# Patient Record
Sex: MALE | Race: WHITE | URBAN - METROPOLITAN AREA
[De-identification: names, ages, dates, MRNs, and addresses within clinical notes are randomized per-mention and may not be internally consistent; named-entity substitution may affect disease eponyms.]

---

## 2021-05-31 ENCOUNTER — RECORDS - HEALTHEAST (OUTPATIENT)
Dept: ADMINISTRATIVE | Facility: CLINIC | Age: 81
End: 2021-05-31

## 2025-04-21 ENCOUNTER — TRANSITIONAL CARE UNIT VISIT (OUTPATIENT)
Dept: GERIATRICS | Facility: CLINIC | Age: 85
End: 2025-04-21
Payer: COMMERCIAL

## 2025-04-21 ENCOUNTER — DOCUMENTATION ONLY (OUTPATIENT)
Dept: GERIATRICS | Facility: CLINIC | Age: 85
End: 2025-04-21

## 2025-04-21 VITALS
TEMPERATURE: 97 F | DIASTOLIC BLOOD PRESSURE: 64 MMHG | BODY MASS INDEX: 21.52 KG/M2 | OXYGEN SATURATION: 100 % | WEIGHT: 142 LBS | RESPIRATION RATE: 18 BRPM | SYSTOLIC BLOOD PRESSURE: 107 MMHG | HEART RATE: 71 BPM | HEIGHT: 68 IN

## 2025-04-21 DIAGNOSIS — N18.32 STAGE 3B CHRONIC KIDNEY DISEASE (H): ICD-10-CM

## 2025-04-21 DIAGNOSIS — C34.90 SQUAMOUS CELL CARCINOMA OF LUNG, UNSPECIFIED LATERALITY (H): ICD-10-CM

## 2025-04-21 DIAGNOSIS — S32.402D CLOSED NONDISPLACED FRACTURE OF LEFT ACETABULUM WITH ROUTINE HEALING, UNSPECIFIED PORTION OF ACETABULUM, SUBSEQUENT ENCOUNTER: Primary | ICD-10-CM

## 2025-04-21 DIAGNOSIS — I71.40 ABDOMINAL AORTIC ANEURYSM (AAA) WITHOUT RUPTURE, UNSPECIFIED PART: ICD-10-CM
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
## 2025-04-21 DIAGNOSIS — J44.9 CHRONIC OBSTRUCTIVE PULMONARY DISEASE, UNSPECIFIED COPD TYPE (H): ICD-10-CM

## 2025-04-21 DIAGNOSIS — I10 PRIMARY HYPERTENSION: ICD-10-CM

## 2025-04-21 PROBLEM — J43.8 OTHER EMPHYSEMA (H): Chronic | Status: ACTIVE | Noted: 2024-05-23

## 2025-04-21 PROBLEM — C34.11 MALIGNANT NEOPLASM OF UPPER LOBE OF RIGHT LUNG (H): Status: ACTIVE | Noted: 2023-09-18

## 2025-04-21 PROBLEM — N40.1 BENIGN PROSTATIC HYPERPLASIA WITH NOCTURIA: Status: ACTIVE | Noted: 2024-05-28

## 2025-04-21 PROBLEM — R35.1 BENIGN PROSTATIC HYPERPLASIA WITH NOCTURIA: Status: ACTIVE | Noted: 2024-05-28

## 2025-04-21 PROBLEM — S32.402A CLOSED FRACTURE OF LEFT ACETABULUM (H): Status: ACTIVE | Noted: 2025-04-15

## 2025-04-21 PROBLEM — C34.91 NON-SMALL CELL CANCER OF RIGHT LUNG (H): Chronic | Status: ACTIVE | Noted: 2023-10-10

## 2025-04-21 PROBLEM — I50.32 CHRONIC HEART FAILURE WITH PRESERVED EJECTION FRACTION (H): Chronic | Status: ACTIVE | Noted: 2023-10-10

## 2025-04-21 PROBLEM — N18.30 CKD (CHRONIC KIDNEY DISEASE), STAGE III (H): Status: ACTIVE | Noted: 2019-06-13

## 2025-04-21 PROCEDURE — 99310 SBSQ NF CARE HIGH MDM 45: CPT

## 2025-04-21 RX ORDER — SERTRALINE HYDROCHLORIDE 25 MG/1
25 TABLET, FILM COATED ORAL DAILY
COMMUNITY

## 2025-04-21 RX ORDER — RISPERIDONE 0.5 MG/1
0.25 TABLET ORAL 2 TIMES DAILY PRN
COMMUNITY

## 2025-04-21 RX ORDER — ASPIRIN 81 MG/1
81 TABLET, CHEWABLE ORAL DAILY
COMMUNITY

## 2025-04-21 RX ORDER — ALBUTEROL SULFATE 90 UG/1
2 INHALANT RESPIRATORY (INHALATION) EVERY 4 HOURS PRN
COMMUNITY

## 2025-04-21 RX ORDER — LOSARTAN POTASSIUM 100 MG/1
100 TABLET ORAL DAILY
COMMUNITY

## 2025-04-21 RX ORDER — ALBUTEROL SULFATE 0.83 MG/ML
2.5 SOLUTION RESPIRATORY (INHALATION) 4 TIMES DAILY PRN
COMMUNITY

## 2025-04-21 RX ORDER — ACETAMINOPHEN 500 MG
1000 TABLET ORAL EVERY 6 HOURS
COMMUNITY

## 2025-04-21 RX ORDER — TAMSULOSIN HYDROCHLORIDE 0.4 MG/1
0.4 CAPSULE ORAL DAILY
COMMUNITY

## 2025-04-21 RX ORDER — ATORVASTATIN CALCIUM 40 MG/1
20 TABLET, FILM COATED ORAL DAILY
COMMUNITY

## 2025-04-21 RX ORDER — OXYCODONE HYDROCHLORIDE 5 MG/1
5 TABLET ORAL EVERY 4 HOURS PRN
COMMUNITY
End: 2025-04-21

## 2025-04-21 RX ORDER — FOLIC ACID 1 MG/1
1 TABLET ORAL DAILY
COMMUNITY

## 2025-04-21 RX ORDER — AMLODIPINE BESYLATE 5 MG/1
5 TABLET ORAL DAILY
COMMUNITY

## 2025-04-21 NOTE — PROGRESS NOTES
"[facility] GERIATRICS    PRIMARY CARE PROVIDER AND CLINIC:  No primary care provider on file., No primary physician on file.  Chief Complaint   Patient presents with    Hospital F/U      [place] Medical Record Number:  [id number]  Place of Service where encounter took place:  [facility] ([facility]) [[id number]]    Arvind Edmonds  is a 85 year old  (1940), admitted to the above facility from  [facility]. Hospital stay 4/15/2025 through 4/18/2025..   HPI:    Brief Hospital Course:  Arvind presented to the ED following a fall at home. He's left leg \"gave out.\" In the ED, CT demonstrated acute nondisplaced acetabular fracture. Seen by trauma surgery and recommended non operative treatment. Transferred to TCU for rehabilitation.     Today Arvind is sitting in his wheelchair. He states he is doing well, pain is controlled with tylenol. He does wish to have this scheduled so he doesn't have to keep track of when he can have it. He has not used any oxycodone since admission to TCU. Sleeping well. Appetite stable. Mood stable. Having daily bowel movements. Denies urinary symptoms including dysuria or hematuria. Denies shortness of breath, chest pain, palpitations, dizziness, lightheadedness. We did discuss his code status and confirmed he is FULL CODE.      CODE STATUS/ADVANCE DIRECTIVES DISCUSSION:  No Order  CPR/Full code   ALLERGIES: No Known Allergies   PAST MEDICAL HISTORY: No past medical history on file.   PAST SURGICAL HISTORY:   has a past surgical history that includes IR Lung Biopsy Mediastinum Right (8/14/2023).  FAMILY HISTORY: family history is not on file.  SOCIAL HISTORY:     Patient's living condition: lives with family, wife and daughter     Current Outpatient Medications   Medication Sig Dispense Refill    acetaminophen (TYLENOL) 500 MG tablet Take 1,000 mg by mouth every 6 hours.      albuterol (PROAIR HFA/PROVENTIL HFA/VENTOLIN HFA) 108 (90 Base) MCG/ACT inhaler Inhale 2 puffs into the " "lungs every 4 hours as needed for shortness of breath, wheezing or cough.      albuterol (PROVENTIL) (2.5 MG/3ML) 0.083% neb solution Take 2.5 mg by nebulization 4 times daily as needed for shortness of breath, wheezing or cough.      amLODIPine (NORVASC) 5 MG tablet Take 5 mg by mouth daily.      aspirin (ASA) 81 MG chewable tablet Take 81 mg by mouth daily.      atorvastatin (LIPITOR) 40 MG tablet Take 20 mg by mouth daily.      Fluticasone-Umeclidin-Vilant (TRELEGY ELLIPTA) 100-62.5-25 MCG/ACT oral inhaler Inhale 1 puff into the lungs daily.      folic acid (FOLVITE) 1 MG tablet Take 1 mg by mouth daily.      losartan (COZAAR) 100 MG tablet Take 100 mg by mouth daily.      risperiDONE (RISPERDAL) 0.5 MG tablet Take 0.25 mg by mouth 2 times daily as needed.      sertraline (ZOLOFT) 25 MG tablet Take 25 mg by mouth daily.      tamsulosin (FLOMAX) 0.4 MG capsule Take 0.4 mg by mouth daily.       No current facility-administered medications for this visit.      ROS:  4 point ROS including Respiratory, CV, GI and , other than that noted in the HPI,  is negative    Vital signs:/64   Pulse 71   Temp 97  F (36.1  C)   Resp 18   Ht 1.727 m (5' 8\")   Wt 64.4 kg (142 lb)   SpO2 100%   BMI 21.59 kg/m     GENERAL APPEARANCE: Well developed, well nourished, in no acute distress.  LUNGS: Lung sounds CTA, no adventitious sounds, respiratory effort normal.  CARD: RRR, S1, S2, without murmurs, gallops, rubs, no JVD, peripheral pulses 2+ and symmetric  ABD: Soft, nondistended and nontender with normal bowel sounds.   MSK: Muscle strength and tone were equal bilaterally. Moves all extremities easily and intentionally.   EXTREMITIES: No cyanosis, clubbing or edema.  NEURO: Alert and oriented x 3. Normal affect. Sensation to touch was normal. Face is symmetric.  PSYCH: memory and judgment impaired at baseline     Lab/Diagnostic data:  Recent labs in [facility] reviewed by me today.     ASSESSMENT/PLAN:  Acute nondisplaced " acetabular fracture  Fall  Non operative treatment plan  Pain management: will schedule tylenol per pt request. Discontinue oxycodone due to not using.   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     Electronically signed by:  RANDY Stuart CNP on 4/21/2025 at 2:10 PM

## 2025-04-21 NOTE — LETTER
" 4/21/2025      Arvind Edmonds  [address] [place] [address]        [facility] GERIATRICS    PRIMARY CARE PROVIDER AND CLINIC:  No primary care provider on file., No primary physician on file.  Chief Complaint   Patient presents with     Hospital F/U      [place] Medical Record Number:  [id number]  Place of Service where encounter took place:  [facility] ([facility]) [[id number]]    Arvind Edmonds  is a 85 year old  (1940), admitted to the above facility from  [facility]. Hospital stay 4/15/2025 through 4/18/2025..   HPI:    Brief Hospital Course:  Arvind presented to the ED following a fall at home. He's left leg \"gave out.\" In the ED, CT demonstrated acute nondisplaced acetabular fracture. Seen by trauma surgery and recommended non operative treatment. Transferred to TCU for rehabilitation.     Today Arvind is sitting in his wheelchair. He states he is doing well, pain is controlled with tylenol. He does wish to have this scheduled so he doesn't have to keep track of when he can have it. He has not used any oxycodone since admission to TCU. Sleeping well. Appetite stable. Mood stable. Having daily bowel movements. Denies urinary symptoms including dysuria or hematuria. Denies shortness of breath, chest pain, palpitations, dizziness, lightheadedness. We did discuss his code status and confirmed he is FULL CODE.      CODE STATUS/ADVANCE DIRECTIVES DISCUSSION:  No Order  CPR/Full code   ALLERGIES: No Known Allergies   PAST MEDICAL HISTORY: No past medical history on file.   PAST SURGICAL HISTORY:   has a past surgical history that includes IR Lung Biopsy Mediastinum Right (8/14/2023).  FAMILY HISTORY: family history is not on file.  SOCIAL HISTORY:     Patient's living condition: lives with family, wife and daughter     Current Outpatient Medications   Medication Sig Dispense Refill     acetaminophen (TYLENOL) 500 MG tablet Take 1,000 mg by mouth every 6 hours.       albuterol (PROAIR " "HFA/PROVENTIL HFA/VENTOLIN HFA) 108 (90 Base) MCG/ACT inhaler Inhale 2 puffs into the lungs every 4 hours as needed for shortness of breath, wheezing or cough.       albuterol (PROVENTIL) (2.5 MG/3ML) 0.083% neb solution Take 2.5 mg by nebulization 4 times daily as needed for shortness of breath, wheezing or cough.       amLODIPine (NORVASC) 5 MG tablet Take 5 mg by mouth daily.       aspirin (ASA) 81 MG chewable tablet Take 81 mg by mouth daily.       atorvastatin (LIPITOR) 40 MG tablet Take 20 mg by mouth daily.       Fluticasone-Umeclidin-Vilant (TRELEGY ELLIPTA) 100-62.5-25 MCG/ACT oral inhaler Inhale 1 puff into the lungs daily.       folic acid (FOLVITE) 1 MG tablet Take 1 mg by mouth daily.       losartan (COZAAR) 100 MG tablet Take 100 mg by mouth daily.       risperiDONE (RISPERDAL) 0.5 MG tablet Take 0.25 mg by mouth 2 times daily as needed.       sertraline (ZOLOFT) 25 MG tablet Take 25 mg by mouth daily.       tamsulosin (FLOMAX) 0.4 MG capsule Take 0.4 mg by mouth daily.       No current facility-administered medications for this visit.      ROS:  4 point ROS including Respiratory, CV, GI and , other than that noted in the HPI,  is negative    Vital signs:/64   Pulse 71   Temp 97  F (36.1  C)   Resp 18   Ht 1.727 m (5' 8\")   Wt 64.4 kg (142 lb)   SpO2 100%   BMI 21.59 kg/m     GENERAL APPEARANCE: Well developed, well nourished, in no acute distress.  LUNGS: Lung sounds CTA, no adventitious sounds, respiratory effort normal.  CARD: RRR, S1, S2, without murmurs, gallops, rubs, no JVD, peripheral pulses 2+ and symmetric  ABD: Soft, nondistended and nontender with normal bowel sounds.   MSK: Muscle strength and tone were equal bilaterally. Moves all extremities easily and intentionally.   EXTREMITIES: No cyanosis, clubbing or edema.  NEURO: Alert and oriented x 3. Normal affect. Sensation to touch was normal. Face is symmetric.  PSYCH: memory and judgment impaired at baseline   "   Lab/Diagnostic data:  Recent labs in [facility] reviewed by me today.     ASSESSMENT/PLAN:  Acute nondisplaced acetabular fracture  Fall  Non operative treatment plan  Pain management: will schedule tylenol per pt request. Discontinue oxycodone due to not using.   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     Electronically signed by:  RANDY Stuart CNP on 4/21/2025 at 2:10 PM                   Sincerely,        RANDY Stuart CNP    Electronically signed

## 2025-04-23 ENCOUNTER — TRANSITIONAL CARE UNIT VISIT (OUTPATIENT)
Dept: GERIATRICS | Facility: CLINIC | Age: 85
End: 2025-04-23
Payer: COMMERCIAL

## 2025-04-23 ENCOUNTER — LAB REQUISITION (OUTPATIENT)
Dept: LAB | Facility: CLINIC | Age: 85
End: 2025-04-23
Payer: COMMERCIAL

## 2025-04-23 VITALS
DIASTOLIC BLOOD PRESSURE: 63 MMHG | SYSTOLIC BLOOD PRESSURE: 106 MMHG | HEIGHT: 68 IN | WEIGHT: 141.8 LBS | BODY MASS INDEX: 21.49 KG/M2 | HEART RATE: 79 BPM | TEMPERATURE: 98.3 F | OXYGEN SATURATION: 95 % | RESPIRATION RATE: 16 BRPM

## 2025-04-23 DIAGNOSIS — N18.32 STAGE 3B CHRONIC KIDNEY DISEASE (H): ICD-10-CM

## 2025-04-23 DIAGNOSIS — I71.40 ABDOMINAL AORTIC ANEURYSM (AAA) WITHOUT RUPTURE, UNSPECIFIED PART: ICD-10-CM

## 2025-04-23 DIAGNOSIS — I10 PRIMARY HYPERTENSION: ICD-10-CM

## 2025-04-23 DIAGNOSIS — J44.9 CHRONIC OBSTRUCTIVE PULMONARY DISEASE, UNSPECIFIED COPD TYPE (H): ICD-10-CM

## 2025-04-23 DIAGNOSIS — N18.30 CHRONIC KIDNEY DISEASE, STAGE 3 UNSPECIFIED (H): ICD-10-CM

## 2025-04-23 DIAGNOSIS — C34.90 SQUAMOUS CELL CARCINOMA OF LUNG, UNSPECIFIED LATERALITY (H): ICD-10-CM

## 2025-04-23 DIAGNOSIS — S32.402D CLOSED NONDISPLACED FRACTURE OF LEFT ACETABULUM WITH ROUTINE HEALING, UNSPECIFIED PORTION OF ACETABULUM, SUBSEQUENT ENCOUNTER: Primary | ICD-10-CM
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
## 2025-04-23 PROCEDURE — 99309 SBSQ NF CARE MODERATE MDM 30: CPT

## 2025-04-23 NOTE — PROGRESS NOTES
"[facility] GERIATRICS    PRIMARY CARE PROVIDER AND CLINIC:  No primary care provider on file., No primary physician on file.  Chief Complaint   Patient presents with    RECHECK      [place] Medical Record Number:  [id number]  Place of Service where encounter took place:  [facility] ([facility]) [[id number]]    Arvind Edmonds  is a 85 year old  (1940), admitted to the above facility from  [facility]. Hospital stay 4/15/2025 through 4/18/2025..   HPI:    Brief Hospital Course:  Arvind presented to the ED following a fall at home. He's left leg \"gave out.\" In the ED, CT demonstrated acute nondisplaced acetabular fracture. Seen by trauma surgery and recommended non operative treatment. Transferred to TCU for rehabilitation.     Today Arvind does not have any concerns. Pain is controlled (mainly in left hip area). Sleeping well. Appetite stable. Mood stable. Having daily bowel movements. Denies urinary symptoms including dysuria or hematuria. Denies shortness of breath, chest pain, palpitations, dizziness, lightheadedness. States he was a heavy smoker years ago and has baseline shortness of breath with activity but has not experienced any worsening shortness of breath. States he is working hard in therapy to get back home.       CODE STATUS/ADVANCE DIRECTIVES DISCUSSION:  Full Code  CPR/Full code     Current Outpatient Medications   Medication Sig Dispense Refill    acetaminophen (TYLENOL) 500 MG tablet Take 1,000 mg by mouth every 6 hours.      albuterol (PROAIR HFA/PROVENTIL HFA/VENTOLIN HFA) 108 (90 Base) MCG/ACT inhaler Inhale 2 puffs into the lungs every 4 hours as needed for shortness of breath, wheezing or cough.      albuterol (PROVENTIL) (2.5 MG/3ML) 0.083% neb solution Take 2.5 mg by nebulization 4 times daily as needed for shortness of breath, wheezing or cough.      amLODIPine (NORVASC) 5 MG tablet Take 5 mg by mouth daily.      aspirin (ASA) 81 MG chewable tablet Take 81 mg by mouth daily.  " "    atorvastatin (LIPITOR) 40 MG tablet Take 20 mg by mouth daily.      Fluticasone-Umeclidin-Vilant (TRELEGY ELLIPTA) 100-62.5-25 MCG/ACT oral inhaler Inhale 1 puff into the lungs daily.      folic acid (FOLVITE) 1 MG tablet Take 1 mg by mouth daily.      losartan (COZAAR) 100 MG tablet Take 100 mg by mouth daily.      risperiDONE (RISPERDAL) 0.5 MG tablet Take 0.25 mg by mouth 2 times daily as needed.      sertraline (ZOLOFT) 25 MG tablet Take 25 mg by mouth daily.      tamsulosin (FLOMAX) 0.4 MG capsule Take 0.4 mg by mouth daily.       No current facility-administered medications for this visit.      ROS:  Other than stated in HPI all other review of systems is negative.      Vital signs:/63   Pulse 79   Temp 98.3  F (36.8  C)   Resp 16   Ht 1.727 m (5' 8\")   Wt 64.3 kg (141 lb 12.8 oz)   SpO2 95%   BMI 21.56 kg/m     GENERAL APPEARANCE: Well developed, well nourished, in no acute distress.  LUNGS: Lung sounds CTA, no adventitious sounds, respiratory effort normal.  CARD: RRR, S1, S2, without murmurs, gallops, rubs, no JVD, peripheral pulses 2+ and symmetric  ABD: Soft, nondistended and nontender with normal bowel sounds.   MSK: Muscle strength and tone were equal bilaterally. Moves all extremities easily and intentionally.   EXTREMITIES: No cyanosis, clubbing or edema.  NEURO: Alert and oriented x 3. Normal affect. Sensation to touch was normal. Face is symmetric.  PSYCH: memory and judgement impaired at baseline     Lab/Diagnostic data:  Labs reviewed in [facility] by me.      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

## 2025-04-23 NOTE — LETTER
" 4/23/2025      Arvind Edmonds  [address] [place] [address]        [facility] GERIATRICS    PRIMARY CARE PROVIDER AND CLINIC:  No primary care provider on file., No primary physician on file.  Chief Complaint   Patient presents with     RECHECK      [place] Medical Record Number:  [id number]  Place of Service where encounter took place:  [facility] ([facility]) [[id number]]    Arvind Edmonds  is a 85 year old  (1940), admitted to the above facility from  [facility]. Hospital stay 4/15/2025 through 4/18/2025..   HPI:    Brief Hospital Course:  Arvind presented to the ED following a fall at home. He's left leg \"gave out.\" In the ED, CT demonstrated acute nondisplaced acetabular fracture. Seen by trauma surgery and recommended non operative treatment. Transferred to TCU for rehabilitation.     Today Arvind does not have any concerns. Pain is controlled (mainly in left hip area). Sleeping well. Appetite stable. Mood stable. Having daily bowel movements. Denies urinary symptoms including dysuria or hematuria. Denies shortness of breath, chest pain, palpitations, dizziness, lightheadedness. States he was a heavy smoker years ago and has baseline shortness of breath with activity but has not experienced any worsening shortness of breath. States he is working hard in therapy to get back home.       CODE STATUS/ADVANCE DIRECTIVES DISCUSSION:  Full Code  CPR/Full code     Current Outpatient Medications   Medication Sig Dispense Refill     acetaminophen (TYLENOL) 500 MG tablet Take 1,000 mg by mouth every 6 hours.       albuterol (PROAIR HFA/PROVENTIL HFA/VENTOLIN HFA) 108 (90 Base) MCG/ACT inhaler Inhale 2 puffs into the lungs every 4 hours as needed for shortness of breath, wheezing or cough.       albuterol (PROVENTIL) (2.5 MG/3ML) 0.083% neb solution Take 2.5 mg by nebulization 4 times daily as needed for shortness of breath, wheezing or cough.       amLODIPine (NORVASC) 5 MG tablet Take 5 mg by mouth " "daily.       aspirin (ASA) 81 MG chewable tablet Take 81 mg by mouth daily.       atorvastatin (LIPITOR) 40 MG tablet Take 20 mg by mouth daily.       Fluticasone-Umeclidin-Vilant (TRELEGY ELLIPTA) 100-62.5-25 MCG/ACT oral inhaler Inhale 1 puff into the lungs daily.       folic acid (FOLVITE) 1 MG tablet Take 1 mg by mouth daily.       losartan (COZAAR) 100 MG tablet Take 100 mg by mouth daily.       risperiDONE (RISPERDAL) 0.5 MG tablet Take 0.25 mg by mouth 2 times daily as needed.       sertraline (ZOLOFT) 25 MG tablet Take 25 mg by mouth daily.       tamsulosin (FLOMAX) 0.4 MG capsule Take 0.4 mg by mouth daily.       No current facility-administered medications for this visit.      ROS:  Other than stated in HPI all other review of systems is negative.      Vital signs:/63   Pulse 79   Temp 98.3  F (36.8  C)   Resp 16   Ht 1.727 m (5' 8\")   Wt 64.3 kg (141 lb 12.8 oz)   SpO2 95%   BMI 21.56 kg/m     GENERAL APPEARANCE: Well developed, well nourished, in no acute distress.  LUNGS: Lung sounds CTA, no adventitious sounds, respiratory effort normal.  CARD: RRR, S1, S2, without murmurs, gallops, rubs, no JVD, peripheral pulses 2+ and symmetric  ABD: Soft, nondistended and nontender with normal bowel sounds.   MSK: Muscle strength and tone were equal bilaterally. Moves all extremities easily and intentionally.   EXTREMITIES: No cyanosis, clubbing or edema.  NEURO: Alert and oriented x 3. Normal affect. Sensation to touch was normal. Face is symmetric.  PSYCH: memory and judgement impaired at baseline     Lab/Diagnostic data:  Labs reviewed in [facility] by me.      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

## 2025-04-25 LAB
ANION GAP SERPL CALCULATED.3IONS-SCNC: 14 MMOL/L (ref 7–15)
BUN SERPL-MCNC: 28.2 MG/DL (ref 8–23)
CALCIUM SERPL-MCNC: 7.9 MG/DL (ref 8.8–10.4)
CHLORIDE SERPL-SCNC: 103 MMOL/L (ref 98–107)
CREAT SERPL-MCNC: 1.98 MG/DL (ref 0.67–1.17)
EGFRCR SERPLBLD CKD-EPI 2021: 32 ML/MIN/1.73M2
ERYTHROCYTE [DISTWIDTH] IN BLOOD BY AUTOMATED COUNT: 15.3 % (ref 10–15)
GLUCOSE SERPL-MCNC: 86 MG/DL (ref 70–99)
HCO3 SERPL-SCNC: 21 MMOL/L (ref 22–29)
HCT VFR BLD AUTO: 36.2 % (ref 40–53)
HGB BLD-MCNC: 11.1 G/DL (ref 13.3–17.7)
MCH RBC QN AUTO: 28.4 PG (ref 26.5–33)
MCHC RBC AUTO-ENTMCNC: 30.7 G/DL (ref 31.5–36.5)
MCV RBC AUTO: 93 FL (ref 78–100)
PLATELET # BLD AUTO: 200 10E3/UL (ref 150–450)
POTASSIUM SERPL-SCNC: 4.5 MMOL/L (ref 3.4–5.3)
RBC # BLD AUTO: 3.91 10E6/UL (ref 4.4–5.9)
SODIUM SERPL-SCNC: 138 MMOL/L (ref 135–145)
WBC # BLD AUTO: 7.3 10E3/UL (ref 4–11)

## 2025-04-25 PROCEDURE — 85027 COMPLETE CBC AUTOMATED: CPT | Mod: ORL

## 2025-04-25 PROCEDURE — 82565 ASSAY OF CREATININE: CPT

## 2025-04-25 PROCEDURE — P9604 ONE-WAY ALLOW PRORATED TRIP: HCPCS | Mod: ORL

## 2025-04-25 PROCEDURE — 36415 COLL VENOUS BLD VENIPUNCTURE: CPT | Mod: ORL

## 2025-04-29 ENCOUNTER — DISCHARGE SUMMARY NURSING HOME (OUTPATIENT)
Dept: GERIATRICS | Facility: CLINIC | Age: 85
End: 2025-04-29
Payer: COMMERCIAL

## 2025-04-29 VITALS
BODY MASS INDEX: 21.92 KG/M2 | OXYGEN SATURATION: 95 % | TEMPERATURE: 97.3 F | HEART RATE: 73 BPM | DIASTOLIC BLOOD PRESSURE: 68 MMHG | WEIGHT: 144.6 LBS | RESPIRATION RATE: 17 BRPM | SYSTOLIC BLOOD PRESSURE: 125 MMHG | HEIGHT: 68 IN

## 2025-04-29 DIAGNOSIS — J44.9 CHRONIC OBSTRUCTIVE PULMONARY DISEASE, UNSPECIFIED COPD TYPE (H): ICD-10-CM

## 2025-04-29 DIAGNOSIS — N18.32 STAGE 3B CHRONIC KIDNEY DISEASE (H): ICD-10-CM
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
## 2025-04-29 DIAGNOSIS — C34.90 SQUAMOUS CELL CARCINOMA OF LUNG, UNSPECIFIED LATERALITY (H): ICD-10-CM

## 2025-04-29 DIAGNOSIS — S32.402D CLOSED NONDISPLACED FRACTURE OF LEFT ACETABULUM WITH ROUTINE HEALING, UNSPECIFIED PORTION OF ACETABULUM, SUBSEQUENT ENCOUNTER: ICD-10-CM

## 2025-04-29 DIAGNOSIS — I10 ESSENTIAL HYPERTENSION: ICD-10-CM

## 2025-04-29 PROCEDURE — 99316 NF DSCHRG MGMT 30 MIN+: CPT | Performed by: NURSE PRACTITIONER

## 2025-04-29 NOTE — PROGRESS NOTES
[facility] TCU DISCHARGE SUMMARY  PATIENT'S NAME: Arvind Edmonds : 1940 MRN: [id number] Place of Service where encounter took place:  [facility] ([facility]) [[id number]] PRIMARY CARE PROVIDER AND CLINIC RESPONSIBLE AFTER TRANSFER: Franny Fitzgerald PA-C, [address] / [place] [address]. Non-[facility] Provider     Transferring providers: Dr. Trudy Oliva, APRN CNP DNP.  Recent Hospitalization/ED: [facility] stay 4/15 to .  Date of TCU Admission: 25.  Date of TCU (anticipated) Discharge: 25.  Discharged to: previous independent home  Cognitive Scores: 4.0/5.6 CPT,  MOCA (lives w/ dtr).   Physical Function: walking 75ft with Front wheel walker Contact guard assist.  DME: Wheelchair (below)  CODE STATUS/ADVANCE DIRECTIVES DISCUSSION:  Full Code.  ALLERGIES: Patient has no known allergies.    DISCHARGE DIAGNOSIS/NURSING FACILITY COURSE:   Fall, subsequent encounter  Closed nondisplaced fracture of left acetabulum with routine healing, unspecified portion of acetabulum, subsequent encounter  Chronic obstructive pulmonary disease, unspecified COPD type (H)  Squamous cell carcinoma of lung, unspecified laterality (H)  Stage 3b chronic kidney disease (H)  Essential hypertension    Hospitalization: Arvind presented to [facility] on 4/15 w/ fall at home. He was found to have a left nondisplaced acetabular fx. He was treated non-operatively and given partial WB status.    Rehab: Arvind presented to TCU on  s/p the above hospitalization. He participated in rehab, pain was managed, and we were able to discontinue oxycodone. His labs were trended, and he overall improved as much as able w/ his partial WB status.     Today, Arvind feels good. He denies headache, dizziness, SOB, cough, fever. His bowels are moving well. He has occasional pain, but barely noted. He agrees to follow-up w/ orthopedics as they've indicated and will follow-up w/ PCP. He does not drive at baseline.     Past Medical  "History:  has no past medical history on file.  Discharge Medications:  Current Outpatient Medications   Medication Sig Dispense Refill    acetaminophen (TYLENOL) 500 MG tablet Take 1,000 mg by mouth every 6 hours.      albuterol (PROAIR HFA/PROVENTIL HFA/VENTOLIN HFA) 108 (90 Base) MCG/ACT inhaler Inhale 2 puffs into the lungs every 4 hours as needed for shortness of breath, wheezing or cough.      albuterol (PROVENTIL) (2.5 MG/3ML) 0.083% neb solution Take 2.5 mg by nebulization 4 times daily as needed for shortness of breath, wheezing or cough.      amLODIPine (NORVASC) 5 MG tablet Take 5 mg by mouth daily.      aspirin (ASA) 81 MG chewable tablet Take 81 mg by mouth daily.      atorvastatin (LIPITOR) 40 MG tablet Take 20 mg by mouth daily.      Fluticasone-Umeclidin-Vilant (TRELEGY ELLIPTA) 100-62.5-25 MCG/ACT oral inhaler Inhale 1 puff into the lungs daily.      folic acid (FOLVITE) 1 MG tablet Take 1 mg by mouth daily.      losartan (COZAAR) 100 MG tablet Take 100 mg by mouth daily.      risperiDONE (RISPERDAL) 0.5 MG tablet Take 0.25 mg by mouth 2 times daily as needed.      sertraline (ZOLOFT) 25 MG tablet Take 25 mg by mouth daily.      tamsulosin (FLOMAX) 0.4 MG capsule Take 0.4 mg by mouth daily.       ROS: Limited secondary to cognitive impairment but today pt reports the above and 4 point ROS including Respiratory, CV, GI and , other than that noted in the HPI, is negative.    Physical Exam: Vitals: /68   Pulse 73   Temp 97.3  F (36.3  C)   Resp 17   Ht 1.727 m (5' 8\")   Wt 65.6 kg (144 lb 9.6 oz)   SpO2 95%   BMI 21.99 kg/m    GENERAL APPEARANCE: Alert, in no distress, cooperative.   ENT: Mouth/posterior oropharynx intact w/ moist mucous membranes, hearing acuity [place].  EYES: EOM, conjunctivae, lids, pupils and irises normal, PERRL2.   RESP: Respiratory effort good, no respiratory distress, Lung sounds clear. On RA.   CV: Auscultation of heart reveals S1, S2, rate and rhythm regular, no " murmur, no rub or gallop, Edema 0+ BLE. Peripheral pulses are 2+.  ABDOMEN: Normal bowel sounds, soft, non-tender abdomen, and no masses palpated.  SKIN: Inspection/Palpation of skin and subcutaneous tissue baseline w/ fragility. No wounds/rashes noted.   NEURO: CN II-XII at patient's baseline, sensation baseline PPS.  PSYCH: Insight, judgement, and memory are baseline impaired, affect and mood are happy/engaged.    Facility Labs: Labs done in SNF are in [place] EPIC. Please refer to them using EPIC/Care Everywhere.    DISCHARGE PLAN:  Follow up labs: No labs orders/due  Medical Follow Up:  Follow up with primary care provider in 1-4 weeks  MTM referral needed: Yes, recommended.   Current [place] scheduled appointments:  None.  Discharge Services: Home Care:  Occupational Therapy, Physical Therapy, and Registered Nurse    Orders:  No new orders.    TOTAL DISCHARGE TIME:   Greater than 30 minutes  ______________      Documentation of Face-to-Face and Certification for Home Health Services   Patient: Arvind Edmonds YOB: 1940  MRN: [id number]  Today's Date: 4/29/2025  I certify that patient: Arvind Edmonds is under my care and that I had a face-to-face encounter that meets the provider  face-to-face encounter requirements with this patient on: 4/29/2025. This encounter with the patient was in whole, or in part, for the following medical condition, which is the primary reason for home health care: Left acetabulum fx. I certify that, based on my findings, the following services are medically necessary home health services: Nursing, Occupational Therapy, and Physical Therapy. My clinical findings support the need for the above services because: Nurse is needed: To provide assessment and oversight required in the home to assure adherence to the medical plan due to: complexity.., Occupational Therapy Services are needed to assess and treat cognitive ability and address ADL safety due to impairment in  mobility., and Physical Therapy Services are needed to assess and treat the following functional impairments: mobility.    Further, I certify that my clinical findings support that this patient is homebound (i.e. absences from home require considerable and taxing effort and are for medical reasons or [employer] services or infrequently or of short duration when for other reasons) because: Requires assistance of another person or specialized equipment to access medical services because patient: Range of motion limitations prevents ability to exit home safely...    Based on the above findings. I certify that this patient is confined to the home and needs intermittent skilled nursing care, physical therapy and/or speech therapy.  The patient is under my care, and I have initiated the establishment of the plan of care.  This patient will be followed by a provider who will periodically review the plan of care.    Provider to give follow up care: Franny Fitzgerald    Responsible Medicare certified PECOS Provider: RANDY Calhoun CNP DNP  Provider Signature: See electronic signature associated with these discharge orders.  Date: 2025  ___  Face to Face and Medical Necessity Statement for DME Provider visit  Demographic Information on Arvind Edmonds:  Gender: male  : 1940  [address] [place] [address]  [phone] (home)   Medical Record: [id number]  Social Security Number: [id number]  Primary Care Provider: Franny Fitzgerald  Insurance: Payor: UNITED HEALTHCARE / Plan: "[employer] MEDICARE ADVANTAGE / Product Type: HMO /   HPI: Arvind Edmonds is a 85 year old  (1940), who is being seen today for a face to face provider visit at [facility]; medical necessity statement for DME included. This patient requires the following:    DME Ordered and Medical Necessity Statement   Wheelchair Documentation  Reason for Type of Wheelchair  Light Weight Wheelchair: Patient is unable to  self-propel a standard wheelchair in the home but can self propel a light weight wheelchair.  Pt needing above DME with expected length of need of 99  years  due to medical necessity associated with following diagnosis:  Size: standard 16 x 16  Corresponding cushion: Yes.  Standard foot rests: Yes  Elevating leg rests: No  Arm rests: Yes.  Lap tray: No  Dose the patient use oxygen? No   Is the patient able to propel wheelchair? Yes   1. The patient has mobility limitations that impairs their ability to participate in one or more mobility related activities: Toileting, Feeding, Grooming, and Bathing.  The wheelchair is suitable and necessary for use in the patient's home.  2. The patient's mobility limitations cannot be safely resolved by using a cane/walker:No, they cannot.    Reason why a cane or walker will not meet the patient's needs. (ie: balance, tolerance, level of assistance) level of assistance, tolerance.   3. The patients home has adequate access to use a manual wheelchair:Yes  4. The use of a manual wheelchair on a regular basis will improve the patients ability to participate in mobility related ADL's at home:Yes  5. The patient is willing to use a manual wheelchair at home:Yes  6. The patient has adequate upper body strength and the mental capability to safely use a manual wheelchair and/or has a caregiver that is able to assist: Yes  7. Does the patient have a lower extremity injury or edema?Yes    Electronically signed by:  Dr. Trudy Oliva, RANDY CNP DNP

## 2025-04-29 NOTE — LETTER
2025      Arvind Edmonds  [address] [place] [address]        [facility] TCU DISCHARGE SUMMARY  PATIENT'S NAME: Arvind Edmonds : 1940 MRN: [id number] Place of Service where encounter took place:  [facility] ([facility]) [[id number]] PRIMARY CARE PROVIDER AND CLINIC RESPONSIBLE AFTER TRANSFER: Franny Fitzgerald PA-C, [address]  / [facility] [address]. Non-[facility] Provider     Transferring providers: Dr. Trudy Oliva, APRN CNP DNP.  Recent Hospitalization/ED: [place] stay 4/15 to .  Date of TCU Admission: 25.  Date of TCU (anticipated) Discharge: 25.  Discharged to: previous independent home  Cognitive Scores: 4.0/5.6 CPT,  MOCA (lives w/ dtr).   Physical Function: walking 75ft with Front wheel walker Contact guard assist.  DME: Wheelchair (below)  CODE STATUS/ADVANCE DIRECTIVES DISCUSSION:  Full Code.  ALLERGIES: Patient has no known allergies.    DISCHARGE DIAGNOSIS/NURSING FACILITY COURSE:   Fall, subsequent encounter  Closed nondisplaced fracture of left acetabulum with routine healing, unspecified portion of acetabulum, subsequent encounter  Chronic obstructive pulmonary disease, unspecified COPD type (H)  Squamous cell carcinoma of lung, unspecified laterality (H)  Stage 3b chronic kidney disease (H)  Essential hypertension    Hospitalization: Arvind presented to [place] on 4/15 w/ fall at home. He was found to have a left nondisplaced acetabular fx. He was treated non-operatively and given partial WB status.    Rehab: Arvind presented to TCU on  s/p the above hospitalization. He participated in rehab, pain was managed, and we were able to discontinue oxycodone. His labs were trended, and he overall improved as much as able w/ his partial WB status.     Today, Arvind feels good. He denies headache, dizziness, SOB, cough, fever. His bowels are moving well. He has occasional pain, but barely noted. He agrees to follow-up w/ orthopedics as they've indicated and will  "follow-up w/ PCP. He does not drive at baseline.     Past Medical History:  has no past medical history on file.  Discharge Medications:  Current Outpatient Medications   Medication Sig Dispense Refill     acetaminophen (TYLENOL) 500 MG tablet Take 1,000 mg by mouth every 6 hours.       albuterol (PROAIR HFA/PROVENTIL HFA/VENTOLIN HFA) 108 (90 Base) MCG/ACT inhaler Inhale 2 puffs into the lungs every 4 hours as needed for shortness of breath, wheezing or cough.       albuterol (PROVENTIL) (2.5 MG/3ML) 0.083% neb solution Take 2.5 mg by nebulization 4 times daily as needed for shortness of breath, wheezing or cough.       amLODIPine (NORVASC) 5 MG tablet Take 5 mg by mouth daily.       aspirin (ASA) 81 MG chewable tablet Take 81 mg by mouth daily.       atorvastatin (LIPITOR) 40 MG tablet Take 20 mg by mouth daily.       Fluticasone-Umeclidin-Vilant (TRELEGY ELLIPTA) 100-62.5-25 MCG/ACT oral inhaler Inhale 1 puff into the lungs daily.       folic acid (FOLVITE) 1 MG tablet Take 1 mg by mouth daily.       losartan (COZAAR) 100 MG tablet Take 100 mg by mouth daily.       risperiDONE (RISPERDAL) 0.5 MG tablet Take 0.25 mg by mouth 2 times daily as needed.       sertraline (ZOLOFT) 25 MG tablet Take 25 mg by mouth daily.       tamsulosin (FLOMAX) 0.4 MG capsule Take 0.4 mg by mouth daily.       ROS: Limited secondary to cognitive impairment but today pt reports the above and 4 point ROS including Respiratory, CV, GI and , other than that noted in the HPI, is negative.    Physical Exam: Vitals: /68   Pulse 73   Temp 97.3  F (36.3  C)   Resp 17   Ht 1.727 m (5' 8\")   Wt 65.6 kg (144 lb 9.6 oz)   SpO2 95%   BMI 21.99 kg/m    GENERAL APPEARANCE: Alert, in no distress, cooperative.   ENT: Mouth/posterior oropharynx intact w/ moist mucous membranes, hearing acuity [place].  EYES: EOM, conjunctivae, lids, pupils and irises normal, PERRL2.   RESP: Respiratory effort good, no respiratory distress, Lung sounds clear. On " RA.   CV: Auscultation of heart reveals S1, S2, rate and rhythm regular, no murmur, no rub or gallop, Edema 0+ BLE. Peripheral pulses are 2+.  ABDOMEN: Normal bowel sounds, soft, non-tender abdomen, and no masses palpated.  SKIN: Inspection/Palpation of skin and subcutaneous tissue baseline w/ fragility. No wounds/rashes noted.   NEURO: CN II-XII at patient's baseline, sensation baseline PPS.  PSYCH: Insight, judgement, and memory are baseline impaired, affect and mood are happy/engaged.    Facility Labs: Labs done in SNF are in [place] EPIC. Please refer to them using "[employer]/Care Everywhere.    DISCHARGE PLAN:  Follow up labs: No labs orders/due  Medical Follow Up:  Follow up with primary care provider in 1-4 weeks  MTM referral needed: Yes, recommended.   Current [place] scheduled appointments:  None.  Discharge Services: Home Care:  Occupational Therapy, Physical Therapy, and Registered Nurse    Orders:  No new orders.    TOTAL DISCHARGE TIME:   Greater than 30 minutes  ______________      Documentation of Face-to-Face and Certification for Home Health Services   Patient: Avrind Edmonds YOB: 1940  MRN: [id number]  Today's Date: 4/29/2025  I certify that patient: Arvind Edmonds is under my care and that I had a face-to-face encounter that meets the provider  face-to-face encounter requirements with this patient on: 4/29/2025. This encounter with the patient was in whole, or in part, for the following medical condition, which is the primary reason for home health care: Left acetabulum fx. I certify that, based on my findings, the following services are medically necessary home health services: Nursing, Occupational Therapy, and Physical Therapy. My clinical findings support the need for the above services because: Nurse is needed: To provide assessment and oversight required in the home to assure adherence to the medical plan due to: complexity.., Occupational Therapy Services are needed to assess and  treat cognitive ability and address ADL safety due to impairment in mobility., and Physical Therapy Services are needed to assess and treat the following functional impairments: mobility.    Further, I certify that my clinical findings support that this patient is homebound (i.e. absences from home require considerable and taxing effort and are for medical reasons or [employer] services or infrequently or of short duration when for other reasons) because: Requires assistance of another person or specialized equipment to access medical services because patient: Range of motion limitations prevents ability to exit home safely...    Based on the above findings. I certify that this patient is confined to the home and needs intermittent skilled nursing care, physical therapy and/or speech therapy.  The patient is under my care, and I have initiated the establishment of the plan of care.  This patient will be followed by a provider who will periodically review the plan of care.    Provider to give follow up care: Franny Fitzgerald    Responsible Medicare certified PECOS Provider: Dr. Trudy Oliva, RANDY CNP DNP  Provider Signature: See electronic signature associated with these discharge orders.  Date: 2025  ___  Face to Face and Medical Necessity Statement for DME Provider visit  Demographic Information on Arvind Edmonds:  Gender: male  : 1940  [address] [place] [address]  [phone] (home)   Medical Record: [id number]  Social Security Number: [id number]  Primary Care Provider: Franny Fitzgerald  Insurance: Payor: UNITED HEALTHCARE / Plan: [employer] MEDICARE ADVANTAGE / Product Type: HMO /   HPI: Arvind Edmonds is a 85 year old  (1940), who is being seen today for a face to face provider visit at [facility]; medical necessity statement for DME included. This patient requires the following:    DME Ordered and Medical Necessity Statement   Wheelchair Documentation  Reason for Type of  Wheelchair  Light Weight Wheelchair: Patient is unable to self-propel a standard wheelchair in the home but can self propel a light weight wheelchair.  Pt needing above DME with expected length of need of 99  years  due to medical necessity associated with following diagnosis:  Size: standard 16 x 16  Corresponding cushion: Yes.  Standard foot rests: Yes  Elevating leg rests: No  Arm rests: Yes.  Lap tray: No  Dose the patient use oxygen? No   Is the patient able to propel wheelchair? Yes   1. The patient has mobility limitations that impairs their ability to participate in one or more mobility related activities: Toileting, Feeding, Grooming, and Bathing.  The wheelchair is suitable and necessary for use in the patient's home.  2. The patient's mobility limitations cannot be safely resolved by using a cane/walker:No, they cannot.    Reason why a cane or walker will not meet the patient's needs. (ie: balance, tolerance, level of assistance) level of assistance, tolerance.   3. The patients home has adequate access to use a manual wheelchair:Yes  4. The use of a manual wheelchair on a regular basis will improve the patients ability to participate in mobility related ADL's at home:Yes  5. The patient is willing to use a manual wheelchair at home:Yes  6. The patient has adequate upper body strength and the mental capability to safely use a manual wheelchair and/or has a caregiver that is able to assist: Yes  7. Does the patient have a lower extremity injury or edema?Yes    Electronically signed by:  RANDY Calhoun CNP DNP                         Sincerely,        RANDY Mills CNP    Electronically signed

## 2025-06-02 ENCOUNTER — TRANSITIONAL CARE UNIT VISIT (OUTPATIENT)
Dept: GERIATRICS | Facility: CLINIC | Age: 85
End: 2025-06-02
Payer: COMMERCIAL

## 2025-06-02 VITALS
DIASTOLIC BLOOD PRESSURE: 72 MMHG | TEMPERATURE: 97.4 F | HEIGHT: 68 IN | BODY MASS INDEX: 21.04 KG/M2 | WEIGHT: 138.8 LBS | RESPIRATION RATE: 18 BRPM | SYSTOLIC BLOOD PRESSURE: 133 MMHG | HEART RATE: 70 BPM | OXYGEN SATURATION: 94 %

## 2025-06-02 DIAGNOSIS — R33.9 URINARY RETENTION: ICD-10-CM

## 2025-06-02 DIAGNOSIS — R63.4 WEIGHT LOSS: ICD-10-CM

## 2025-06-02 DIAGNOSIS — M62.81 GENERALIZED MUSCLE WEAKNESS: ICD-10-CM

## 2025-06-02 DIAGNOSIS — N18.32 STAGE 3B CHRONIC KIDNEY DISEASE (H): ICD-10-CM
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
## 2025-06-02 DIAGNOSIS — I10 ESSENTIAL HYPERTENSION: ICD-10-CM

## 2025-06-02 DIAGNOSIS — C34.90 SQUAMOUS CELL CARCINOMA OF LUNG, UNSPECIFIED LATERALITY (H): ICD-10-CM

## 2025-06-02 DIAGNOSIS — Z85.51 PERSONAL HISTORY OF MALIGNANT NEOPLASM OF BLADDER: ICD-10-CM

## 2025-06-02 DIAGNOSIS — S22.080D COMPRESSION FRACTURE OF T12 VERTEBRA WITH ROUTINE HEALING, SUBSEQUENT ENCOUNTER: ICD-10-CM

## 2025-06-02 DIAGNOSIS — N30.00 ACUTE CYSTITIS WITHOUT HEMATURIA: ICD-10-CM

## 2025-06-02 DIAGNOSIS — J44.9 CHRONIC OBSTRUCTIVE PULMONARY DISEASE, UNSPECIFIED COPD TYPE (H): ICD-10-CM

## 2025-06-02 PROBLEM — R53.1 WEAKNESS: Status: ACTIVE | Noted: 2025-05-22

## 2025-06-02 PROBLEM — D64.9 NORMOCYTIC ANEMIA: Status: ACTIVE | Noted: 2025-05-22

## 2025-06-02 PROBLEM — N39.0 UTI (URINARY TRACT INFECTION): Status: ACTIVE | Noted: 2025-05-22

## 2025-06-02 PROBLEM — F41.9 ANXIETY AND DEPRESSION: Status: ACTIVE | Noted: 2025-05-08

## 2025-06-02 PROBLEM — M85.80 OSTEOPENIA: Status: ACTIVE | Noted: 2025-05-08

## 2025-06-02 PROBLEM — S22.080A T12 COMPRESSION FRACTURE (H): Status: ACTIVE | Noted: 2025-05-22
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
## 2025-06-02 PROBLEM — F32.A ANXIETY AND DEPRESSION: Status: ACTIVE | Noted: 2025-05-08

## 2025-06-02 PROBLEM — E87.20 METABOLIC ACIDOSIS: Status: ACTIVE | Noted: 2025-05-22

## 2025-06-02 PROBLEM — F42.9 OBSESSIVE-COMPULSIVE DISORDER: Status: ACTIVE | Noted: 2025-05-08

## 2025-06-02 PROBLEM — E87.6 HYPOKALEMIA: Status: ACTIVE | Noted: 2025-05-22

## 2025-06-02 PROBLEM — S32.010A CLOSED COMPRESSION FRACTURE OF BODY OF L1 VERTEBRA (H): Status: ACTIVE | Noted: 2025-05-25

## 2025-06-02 PROBLEM — E83.51 HYPOCALCEMIA: Status: ACTIVE | Noted: 2025-05-22

## 2025-06-02 PROCEDURE — 99309 SBSQ NF CARE MODERATE MDM 30: CPT

## 2025-06-02 RX ORDER — TAMSULOSIN HYDROCHLORIDE 0.4 MG/1
0.8 CAPSULE ORAL DAILY
COMMUNITY
Start: 2025-06-02

## 2025-06-02 NOTE — LETTER
" 6/2/2025      Arvind Edmonds  [address] [place] [address]        [facility] GERIATRICS    PRIMARY CARE PROVIDER AND CLINIC:  Franny Fitzgerald PA-C, [address] [place] [id number]  Chief Complaint   Patient presents with     Hospital F/U      [facility] Medical Record Number:  [id number]  Place of Service where encounter took place:  [facility] ([facility]) [[id number]]    Arvind Edmonds  is a 85 year old  (1940), admitted to the above facility from  [facility]. Hospital stay 5/22/2025 through 5/29/2025.. Pt has a past medical hx including hyperlipidemia, CKD stage III, COPD, hypertension, history of lung cancer.    HPI:    Brief Hospital Course Summary:  Patient was admitted to the hospital for weakness, weight loss and falls. Family report she has spent the last week in bed. There was concern for UTI so he was treated with IVF and ceftriaxone. Thoracic spine CT and upright thoracic X-rays showed stable compression deformities at L1 and L2. Patient was placed in TLSO. All other workup was unremarkable. He did have a fall in the hospital, was able to get himself up.     Today he is laying in bed following being in the dining room for breakfast. States he feels \"pretty good.\" States he is eating well here in TCU. He states his pain is minimal, mostly in his back. Not having any trouble sleeping. Mood stable. Having daily bowel movements. Denies urinary symptoms including dysuria or hematuria. Denies shortness of breath, chest pain, palpitations, dizziness, lightheadedness. Today we discussed his goals of care and code status. He states he \"never imagined living this long.\" He would like to change his code status to DNR/DNI given age and current health status.     CODE STATUS/ADVANCE DIRECTIVES DISCUSSION:  Full Code  CPR/Full code   ALLERGIES: No Known Allergies   PAST MEDICAL HISTORY: No past medical history on file.   PAST SURGICAL HISTORY:   has a past surgical history that " "includes IR Lung Biopsy Mediastinum Right (8/14/2023).  FAMILY HISTORY: family history is not on file.  SOCIAL HISTORY:     Patient's living condition: lives with spouse and adult daughter    Current Outpatient Medications   Medication Sig Dispense Refill     acetaminophen (TYLENOL) 500 MG tablet Take 1,000 mg by mouth every 6 hours.       albuterol (PROAIR HFA/PROVENTIL HFA/VENTOLIN HFA) 108 (90 Base) MCG/ACT inhaler Inhale 2 puffs into the lungs every 4 hours as needed for shortness of breath, wheezing or cough.       albuterol (PROVENTIL) (2.5 MG/3ML) 0.083% neb solution Take 2.5 mg by nebulization 4 times daily as needed for shortness of breath, wheezing or cough.       amLODIPine (NORVASC) 5 MG tablet Take 5 mg by mouth daily.       aspirin (ASA) 81 MG chewable tablet Take 81 mg by mouth daily.       atorvastatin (LIPITOR) 40 MG tablet Take 20 mg by mouth daily.       Fluticasone-Umeclidin-Vilant (TRELEGY ELLIPTA) 100-62.5-25 MCG/ACT oral inhaler Inhale 1 puff into the lungs daily.       folic acid (FOLVITE) 1 MG tablet Take 1 mg by mouth daily.       risperiDONE (RISPERDAL) 0.5 MG tablet Take 0.25 mg by mouth 2 times daily as needed.       sertraline (ZOLOFT) 25 MG tablet Take 25 mg by mouth daily.       tamsulosin (FLOMAX) 0.4 MG capsule Take 2 capsules (0.8 mg) by mouth daily.       No current facility-administered medications for this visit.      ROS:  4 point ROS including Respiratory, CV, GI and , other than that noted in the HPI,  is negative    Objective:  Vital signs:/72   Pulse 70   Temp 97.4  F (36.3  C)   Resp 18   Ht 1.727 m (5' 8\")   Wt 63 kg (138 lb 12.8 oz)   SpO2 94%   BMI 21.10 kg/m     GENERAL APPEARANCE: Well developed, well nourished, in no acute distress.  LUNGS: expiratory rub (baseline) respiratory effort normal.  CARD: RRR, S1, S2, without murmurs, gallops, rubs, no JVD, peripheral pulses 2+ and symmetric  ABD: Soft, nondistended and nontender with normal bowel sounds. "   MSK: Muscle strength and tone were equal bilaterally. Moves all extremities easily and intentionally.   EXTREMITIES: No cyanosis, clubbing or edema.  NEURO: Alert with cognitive impairment  PSYCH: memory and judgement impaired at baseline, able to make decisions for self     Lab/Diagnostic data:  Recent labs in [facility] reviewed by me today.     ASSESSMENT/PLAN:  Generalized Weakness  Fall  T12 compression fracture (HC)  Weight loss   X-ray lumbar spine with age-indeterminate T12 compression deformity, chronic L1 compression deformity  PT/OT in TCU  Continue PRN tylenol and brace, no bending, lifting, twisting  Appreciate social work recommendations for a safe discharge  Unclear what previous weight was, eating well in TCU, will continue to trend weights in TCU    Urinary retention  Hx of bladder cancer   UTI  Tamsulosin was increased to 0.8 mg daily in the hospital  Denies urinary symptoms today  UTI resolved, completed ceftriaxone course in hospital, no s/s of UTI today    HTN (hypertension)  Bps 1 teens-130s/60-80s  Continue amlodipine   Continue to hold losartan    COPD (chronic obstructive pulmonary disease) (HC)  Compensated  Breathing comfortably on room air  Continue Trelegy (missed several doses due to delay from pharmacy) and albuterol inhaler and nebulizer    CKD (chronic kidney disease), stage III (HC)  Stable  Last creat 1.7  Consider follow up BMP in the next week    Malignant neoplasm of upper lobe of right lung (HC)  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

## 2025-06-02 NOTE — PROGRESS NOTES
"[facility] GERIATRICS    PRIMARY CARE PROVIDER AND CLINIC:  Franny Fitzgerald PA-C, [address] [facility] [id number]  Chief Complaint   Patient presents with    Hospital F/U      [facility] Medical Record Number:  [id number]  Place of Service where encounter took place:  [facility] ([facility]) [[id number]]    Arvind Edmonds  is a 85 year old  (1940), admitted to the above facility from  [facility]. Hospital stay 5/22/2025 through 5/29/2025.. Pt has a past medical hx including hyperlipidemia, CKD stage III, COPD, hypertension, history of lung cancer.    HPI:    Brief Hospital Course Summary:  Patient was admitted to the hospital for weakness, weight loss and falls. Family report she has spent the last week in bed. There was concern for UTI so he was treated with IVF and ceftriaxone. Thoracic spine CT and upright thoracic X-rays showed stable compression deformities at L1 and L2. Patient was placed in TLSO. All other workup was unremarkable. He did have a fall in the hospital, was able to get himself up.     Today he is laying in bed following being in the dining room for breakfast. States he feels \"pretty good.\" States he is eating well here in TCU. He states his pain is minimal, mostly in his back. Not having any trouble sleeping. Mood stable. Having daily bowel movements. Denies urinary symptoms including dysuria or hematuria. Denies shortness of breath, chest pain, palpitations, dizziness, lightheadedness. Today we discussed his goals of care and code status. He states he \"never imagined living this long.\" He would like to change his code status to DNR/DNI given age and current health status.     CODE STATUS/ADVANCE DIRECTIVES DISCUSSION:  Full Code  CPR/Full code   ALLERGIES: No Known Allergies   PAST MEDICAL HISTORY: No past medical history on file.   PAST SURGICAL HISTORY:   has a past surgical history that includes IR Lung Biopsy Mediastinum Right (8/14/2023).  FAMILY HISTORY: " "family history is not on file.  SOCIAL HISTORY:     Patient's living condition: lives with spouse and adult daughter    Current Outpatient Medications   Medication Sig Dispense Refill    acetaminophen (TYLENOL) 500 MG tablet Take 1,000 mg by mouth every 6 hours.      albuterol (PROAIR HFA/PROVENTIL HFA/VENTOLIN HFA) 108 (90 Base) MCG/ACT inhaler Inhale 2 puffs into the lungs every 4 hours as needed for shortness of breath, wheezing or cough.      albuterol (PROVENTIL) (2.5 MG/3ML) 0.083% neb solution Take 2.5 mg by nebulization 4 times daily as needed for shortness of breath, wheezing or cough.      amLODIPine (NORVASC) 5 MG tablet Take 5 mg by mouth daily.      aspirin (ASA) 81 MG chewable tablet Take 81 mg by mouth daily.      atorvastatin (LIPITOR) 40 MG tablet Take 20 mg by mouth daily.      Fluticasone-Umeclidin-Vilant (TRELEGY ELLIPTA) 100-62.5-25 MCG/ACT oral inhaler Inhale 1 puff into the lungs daily.      folic acid (FOLVITE) 1 MG tablet Take 1 mg by mouth daily.      risperiDONE (RISPERDAL) 0.5 MG tablet Take 0.25 mg by mouth 2 times daily as needed.      sertraline (ZOLOFT) 25 MG tablet Take 25 mg by mouth daily.      tamsulosin (FLOMAX) 0.4 MG capsule Take 2 capsules (0.8 mg) by mouth daily.       No current facility-administered medications for this visit.      ROS:  4 point ROS including Respiratory, CV, GI and , other than that noted in the HPI,  is negative    Objective:  Vital signs:/72   Pulse 70   Temp 97.4  F (36.3  C)   Resp 18   Ht 1.727 m (5' 8\")   Wt 63 kg (138 lb 12.8 oz)   SpO2 94%   BMI 21.10 kg/m     GENERAL APPEARANCE: Well developed, well nourished, in no acute distress.  LUNGS: expiratory rub (baseline) respiratory effort normal.  CARD: RRR, S1, S2, without murmurs, gallops, rubs, no JVD, peripheral pulses 2+ and symmetric  ABD: Soft, nondistended and nontender with normal bowel sounds.   MSK: Muscle strength and tone were equal bilaterally. Moves all extremities easily " and intentionally.   EXTREMITIES: No cyanosis, clubbing or edema.  NEURO: Alert with cognitive impairment  PSYCH: memory and judgement impaired at baseline, able to make decisions for self     Lab/Diagnostic data:  Recent labs in [facility] reviewed by me today.     ASSESSMENT/PLAN:  Generalized Weakness  Fall  T12 compression fracture (HC)  Weight loss   Goals of care counseling  X-ray lumbar spine with age-indeterminate T12 compression deformity, chronic L1 compression deformity  PT/OT in TCU  Continue PRN tylenol and brace, no bending, lifting, twisting  Appreciate social work recommendations for a safe discharge  Unclear what previous weight was, eating well in TCU, will continue to trend weights in TCU  See HPI for goals of care conversation. Update code status to DNR/DNI    Urinary retention  Hx of bladder cancer   UTI  Tamsulosin was increased to 0.8 mg daily in the hospital  Denies urinary symptoms today  UTI resolved, completed ceftriaxone course in hospital, no s/s of UTI today    HTN (hypertension)  Bps 1 teens-130s/60-80s  Continue amlodipine   Continue to hold losartan    COPD (chronic obstructive pulmonary disease) (HC)  Compensated  Breathing comfortably on room air  Continue Trelegy (missed several doses due to delay from pharmacy) and albuterol inhaler and nebulizer    CKD (chronic kidney disease), stage III (HC)  Stable  Last creat 1.7  Consider follow up BMP in the next week    Malignant neoplasm of upper lobe of right lung (HC)  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

## 2025-06-04 ENCOUNTER — TRANSITIONAL CARE UNIT VISIT (OUTPATIENT)
Dept: GERIATRICS | Facility: CLINIC | Age: 85
End: 2025-06-04
Payer: COMMERCIAL

## 2025-06-04 VITALS
DIASTOLIC BLOOD PRESSURE: 64 MMHG | WEIGHT: 137.4 LBS | BODY MASS INDEX: 20.82 KG/M2 | RESPIRATION RATE: 18 BRPM | TEMPERATURE: 98.8 F | HEART RATE: 63 BPM | OXYGEN SATURATION: 94 % | SYSTOLIC BLOOD PRESSURE: 108 MMHG | HEIGHT: 68 IN

## 2025-06-04 NOTE — LETTER
6/4/2025      Arvind Edmonds  [address] [place] [address]        No notes on file      Sincerely,        RANDY Stuart CNP    Electronically signed

## 2025-06-05 NOTE — PROGRESS NOTES
[facility] GERIATRICS DISCHARGE SUMMARY  PATIENT'S NAME: Arvind Edmonds  YOB: 1940  MEDICAL RECORD NUMBER:  [id number]  Place of Service where encounter took place:  [facility] ([facility]) [[id number]]    PRIMARY CARE PROVIDER AND CLINIC RESPONSIBLE AFTER TRANSFER:   Franny Fitzgerald PA-C, [address] [facility] [phone]     Transferring providers: RANDY Stuart CNP, Lyndsey Holland MD  Recent Hospitalization/ED:  [facility] stay 5/22/25 to 5/29/25.  Date of SNF Admission: 5/29/25  Date of SNF (anticipated) Discharge: 6/8/25  Discharged to: previous independent home with wife and adult daughter  Cognitive Scores: {[id number]}  Physical Function: stand by assist of 1 with walker  DME: No new DME needed    CODE STATUS/ADVANCE DIRECTIVES DISCUSSION:  No CPR- Do NOT Intubate   ALLERGIES: Patient has no known allergies.    NURSING FACILITY COURSE   Generalized Weakness  Fall  T12 compression fracture (HC)  Weight loss   Goals of care counseling  X-ray lumbar spine with age-indeterminate T12 compression deformity, chronic L1 compression deformity  PT/OT in TCU  Continue PRN tylenol and brace, no bending, lifting, twisting  Appreciate social work recommendations for a safe discharge  Weight continues to down trend (144 lbs on admission to TCU, today 137 lbs)- recommend outpt dietician consult as well as follow up with PCP for further work up. He does have a history of bladder and lung cancers, warranting further work up if within his goals of care     HTN (hypertension)  Bps 1 teens-130s/60-80s  Continue amlodipine     COPD (chronic obstructive pulmonary disease) (HC)  Compensated  Breathing comfortably on room air  Continue Trelegy and albuterol inhaler and nebulizer     CKD (chronic kidney disease), stage III (HC)  Stable  Last creat 1.7  PCP to trend BMP     Malignant neoplasm of upper lobe of right lung (HC)  Previous squamous cell carcinoma of  "the lung status post XRT with good radiographic response  Follow up per Pulmonology (last seen Sept 2024)  See weight loss above    Current Outpatient Medications   Medication Sig Dispense Refill    acetaminophen (TYLENOL) 500 MG tablet Take 1,000 mg by mouth every 6 hours.      albuterol (PROAIR HFA/PROVENTIL HFA/VENTOLIN HFA) 108 (90 Base) MCG/ACT inhaler Inhale 2 puffs into the lungs every 4 hours as needed for shortness of breath, wheezing or cough.      albuterol (PROVENTIL) (2.5 MG/3ML) 0.083% neb solution Take 2.5 mg by nebulization 4 times daily as needed for shortness of breath, wheezing or cough.      amLODIPine (NORVASC) 5 MG tablet Take 5 mg by mouth daily.      aspirin (ASA) 81 MG chewable tablet Take 81 mg by mouth daily.      atorvastatin (LIPITOR) 40 MG tablet Take 20 mg by mouth daily.      Fluticasone-Umeclidin-Vilant (TRELEGY ELLIPTA) 100-62.5-25 MCG/ACT oral inhaler Inhale 1 puff into the lungs daily.      folic acid (FOLVITE) 1 MG tablet Take 1 mg by mouth daily.      risperiDONE (RISPERDAL) 0.5 MG tablet Take 0.25 mg by mouth 2 times daily as needed.      sertraline (ZOLOFT) 25 MG tablet Take 25 mg by mouth daily.      tamsulosin (FLOMAX) 0.4 MG capsule Take 2 capsules (0.8 mg) by mouth daily.       No current facility-administered medications for this visit.      Controlled medications:   not applicable/none     Past Medical History: No past medical history on file.  Physical Exam:   Vital signs:/64   Pulse 63   Temp 98.8  F (37.1  C)   Resp 18   Ht 1.727 m (5' 8\")   Wt 62.3 kg (137 lb 6.4 oz)   SpO2 94%   BMI 20.89 kg/m     GENERAL APPEARANCE: Elderly male in NAD  LUNGS: Diminished lung sounds at baseline  CARD: RRR, S1, S2, without murmurs, gallops, rubs, no JVD, peripheral pulses 2+ and symmetric  ABD: Soft, nondistended and nontender with normal bowel sounds.   MSK: Muscle strength and tone were equal bilaterally. Moves all extremities easily and intentionally.   EXTREMITIES: " No cyanosis, clubbing or edema.  NEURO: Alert   PSYCH: memory and judgement impaired at baseline     SNF labs: Recent labs in [facility] reviewed by me today.     DISCHARGE PLAN:  Follow up labs: Follow up BMP to trend kidney function  Medical Follow Up:      Follow up with primary care provider in 1 week - address weight loss, trend kidney function              Follow up with pulmonology  Discharge Services: Home Care:  Occupational Therapy, Physical Therapy, and Home Health Aide    TOTAL DISCHARGE TIME:   Greater than 30 minutes  Electronically signed by:  RANDY Stuart CNP     Documentation of Face to Face and Certification for Home Health Services    I certify that patient: Arvind Edmonds is under my care and that I, or a nurse practitioner or physician's assistant working with me, had a face-to-face encounter that meets the physician face-to-face encounter requirements with this patient on: 6/4/25    This encounter with the patient was in whole, or in part, for the following medical condition, which is the primary reason for home health care: Generalized Weakness  Fall  T12 compression fracture (HC)  Weight loss   Goals of care counseling  HTN (hypertension)  COPD (chronic obstructive pulmonary disease) (HC)  CKD (chronic kidney disease), stage III (HC)  Malignant neoplasm of upper lobe of right lung (HC)    I certify that, based on my findings, the following services are medically necessary home health services: Occupational Therapy, Physical Therapy, and home health aide.    My clinical findings support the need for the above services because: Occupational Therapy Services are needed to assess and treat cognitive ability and address ADL safety due to impairment in ability to transfer independently safely. and Physical Therapy Services are needed to assess and treat the following functional impairments: ability to transfer independently safely.    Further, I certify that my clinical findings support  that this patient is homebound (i.e. absences from home require considerable and taxing effort and are for medical reasons or Religion services or infrequently or of short duration when for other reasons) because: Requires assistance of another person or specialized equipment to access medical services because patient: Requires supervision of another for safe transfer...    Based on the above findings. I certify that this patient is confined to the home and needs intermittent skilled nursing care, physical therapy and/or speech therapy.  The patient is under my care, and I have initiated the establishment of the plan of care.  This patient will be followed by a physician who will periodically review the plan of care.  Physician/Provider to provide follow up care: Franny Fitzgerald    Attending hospital physician (the Medicare certified [facility] provider): RANDY Stuart CNP  Physician Signature: See electronic signature associated with these discharge orders.  Date: 6/5/2025